# Patient Record
Sex: FEMALE | URBAN - METROPOLITAN AREA
[De-identification: names, ages, dates, MRNs, and addresses within clinical notes are randomized per-mention and may not be internally consistent; named-entity substitution may affect disease eponyms.]

---

## 2022-11-21 ENCOUNTER — APPOINTMENT (OUTPATIENT)
Dept: URBAN - METROPOLITAN AREA CLINIC 279 | Age: 52
Setting detail: DERMATOLOGY
End: 2022-12-19

## 2022-11-21 DIAGNOSIS — L50.3 DERMATOGRAPHIC URTICARIA: ICD-10-CM

## 2022-11-21 PROCEDURE — OTHER COUNSELING: OTHER

## 2022-11-21 PROCEDURE — OTHER PRESCRIPTION: OTHER

## 2022-11-21 PROCEDURE — OTHER ADDITIONAL NOTES: OTHER

## 2022-11-21 PROCEDURE — OTHER TREATMENT REGIMEN: OTHER

## 2022-11-21 PROCEDURE — OTHER MIPS QUALITY: OTHER

## 2022-11-21 PROCEDURE — OTHER MEDICATION COUNSELING: OTHER

## 2022-11-21 PROCEDURE — 99202 OFFICE O/P NEW SF 15 MIN: CPT

## 2022-11-21 RX ORDER — BETAMETHASONE DIPROPIONATE 0.5 MG/G
OINTMENT TOPICAL
Qty: 45 | Refills: 0 | Status: ERX | COMMUNITY
Start: 2022-11-21

## 2022-11-21 RX ORDER — CETIRIZINE HCL 1 MG/ML
SOLUTION, ORAL ORAL QD
Qty: 60 | Refills: 0 | Status: ERX | COMMUNITY
Start: 2022-11-21

## 2022-11-21 NOTE — PROCEDURE: ADDITIONAL NOTES
Render Risk Assessment In Note?: no
Additional Notes: If patient experiences drowsiness from the Zyrtec, she will not drive. If one zyrtec pill is not enough, she will take a second one around noon.
Detail Level: Simple

## 2022-11-21 NOTE — PROCEDURE: TREATMENT REGIMEN
Plan: Patient will practice good dry skincare
Initiate Treatment: Zyrtec 10 mg and betamethasone ointment
Detail Level: Zone

## 2022-11-21 NOTE — PROCEDURE: MEDICATION COUNSELING
no fever and no chills. Ketoconazole Pregnancy And Lactation Text: This medication is Pregnancy Category C and it isn't know if it is safe during pregnancy. It is also excreted in breast milk and breast feeding isn't recommended.

## 2023-02-16 ENCOUNTER — RX ONLY (RX ONLY)
Age: 53
End: 2023-02-16

## 2023-02-16 ENCOUNTER — APPOINTMENT (OUTPATIENT)
Dept: URBAN - METROPOLITAN AREA CLINIC 279 | Age: 53
Setting detail: DERMATOLOGY
End: 2023-04-16

## 2023-02-16 DIAGNOSIS — L30.9 DERMATITIS, UNSPECIFIED: ICD-10-CM

## 2023-02-16 DIAGNOSIS — L50.3 DERMATOGRAPHIC URTICARIA: ICD-10-CM

## 2023-02-16 PROCEDURE — OTHER ADDITIONAL NOTES: OTHER

## 2023-02-16 PROCEDURE — OTHER MIPS QUALITY: OTHER

## 2023-02-16 PROCEDURE — 99213 OFFICE O/P EST LOW 20 MIN: CPT

## 2023-02-16 PROCEDURE — OTHER COUNSELING: OTHER

## 2023-02-16 PROCEDURE — OTHER TREATMENT REGIMEN: OTHER

## 2023-02-16 RX ORDER — BETAMETHASONE DIPROPIONATE 0.5 MG/G
OINTMENT TOPICAL
Qty: 45 | Refills: 0 | Status: ERX

## 2023-02-16 RX ORDER — CETIRIZINE HCL 1 MG/ML
SOLUTION, ORAL ORAL QD
Qty: 60 | Refills: 5 | Status: ERX

## 2023-02-16 NOTE — PROCEDURE: TREATMENT REGIMEN
Continue Regimen: Zyrtec 10 mg and betamethasone ointment
Plan: Patient will use bland, thick, hypoallergenic, and fragrance-free products (moisturizers, cleansers, and detergents). Patient will moisturize multiple times per day.
Detail Level: Zone
Plan: Patient will practice good dry skincare
clear
